# Patient Record
Sex: MALE | HISPANIC OR LATINO | Employment: OTHER | URBAN - METROPOLITAN AREA
[De-identification: names, ages, dates, MRNs, and addresses within clinical notes are randomized per-mention and may not be internally consistent; named-entity substitution may affect disease eponyms.]

---

## 2020-10-02 ENCOUNTER — RECORDS - HEALTHEAST (OUTPATIENT)
Dept: LAB | Facility: CLINIC | Age: 45
End: 2020-10-02

## 2020-10-02 LAB — HBA1C MFR BLD: 5.9 %

## 2021-05-18 ENCOUNTER — RECORDS - HEALTHEAST (OUTPATIENT)
Dept: LAB | Facility: HOSPITAL | Age: 46
End: 2021-05-18

## 2021-05-18 LAB — HBA1C MFR BLD: 5.8 %

## 2022-03-30 ENCOUNTER — APPOINTMENT (OUTPATIENT)
Dept: RADIOLOGY | Facility: HOSPITAL | Age: 47
End: 2022-03-30
Attending: EMERGENCY MEDICINE
Payer: MEDICAID

## 2022-03-30 ENCOUNTER — HOSPITAL ENCOUNTER (EMERGENCY)
Facility: HOSPITAL | Age: 47
Discharge: HOME | End: 2022-03-30
Attending: EMERGENCY MEDICINE | Admitting: EMERGENCY MEDICINE
Payer: MEDICAID

## 2022-03-30 VITALS
WEIGHT: 218 LBS | HEART RATE: 87 BPM | OXYGEN SATURATION: 95 % | RESPIRATION RATE: 18 BRPM | TEMPERATURE: 98.5 F | SYSTOLIC BLOOD PRESSURE: 147 MMHG | DIASTOLIC BLOOD PRESSURE: 87 MMHG

## 2022-03-30 DIAGNOSIS — K21.9 GASTROESOPHAGEAL REFLUX DISEASE WITHOUT ESOPHAGITIS: ICD-10-CM

## 2022-03-30 DIAGNOSIS — R06.2 WHEEZING: ICD-10-CM

## 2022-03-30 LAB
FLUAV RNA SPEC QL NAA+PROBE: NEGATIVE
FLUBV RNA RESP QL NAA+PROBE: NEGATIVE
GROUP A STREP BY PCR: NOT DETECTED
SARS-COV-2 RNA RESP QL NAA+PROBE: NEGATIVE

## 2022-03-30 PROCEDURE — 71045 X-RAY EXAM CHEST 1 VIEW: CPT

## 2022-03-30 PROCEDURE — 250N000013 HC RX MED GY IP 250 OP 250 PS 637: Performed by: EMERGENCY MEDICINE

## 2022-03-30 PROCEDURE — 87651 STREP A DNA AMP PROBE: CPT | Performed by: EMERGENCY MEDICINE

## 2022-03-30 PROCEDURE — 87636 SARSCOV2 & INF A&B AMP PRB: CPT | Performed by: STUDENT IN AN ORGANIZED HEALTH CARE EDUCATION/TRAINING PROGRAM

## 2022-03-30 PROCEDURE — 99284 EMERGENCY DEPT VISIT MOD MDM: CPT | Mod: 25

## 2022-03-30 PROCEDURE — 94640 AIRWAY INHALATION TREATMENT: CPT

## 2022-03-30 PROCEDURE — C9803 HOPD COVID-19 SPEC COLLECT: HCPCS

## 2022-03-30 RX ORDER — FAMOTIDINE 20 MG/1
20 TABLET, FILM COATED ORAL 2 TIMES DAILY
Qty: 30 TABLET | Refills: 0 | Status: SHIPPED | OUTPATIENT
Start: 2022-03-30

## 2022-03-30 RX ORDER — ALBUTEROL SULFATE 90 UG/1
4 AEROSOL, METERED RESPIRATORY (INHALATION) EVERY 6 HOURS PRN
Status: DISCONTINUED | OUTPATIENT
Start: 2022-03-30 | End: 2022-03-30 | Stop reason: HOSPADM

## 2022-03-30 RX ORDER — PREDNISONE 20 MG/1
TABLET ORAL
Qty: 10 TABLET | Refills: 0 | Status: SHIPPED | OUTPATIENT
Start: 2022-03-30 | End: 2022-08-17

## 2022-03-30 RX ORDER — ALBUTEROL SULFATE 90 UG/1
2 AEROSOL, METERED RESPIRATORY (INHALATION) EVERY 6 HOURS PRN
Qty: 18 G | Refills: 0 | Status: SHIPPED | OUTPATIENT
Start: 2022-03-30

## 2022-03-30 RX ORDER — MAGNESIUM HYDROXIDE/ALUMINUM HYDROXICE/SIMETHICONE 120; 1200; 1200 MG/30ML; MG/30ML; MG/30ML
30 SUSPENSION ORAL ONCE
Status: COMPLETED | OUTPATIENT
Start: 2022-03-30 | End: 2022-03-30

## 2022-03-30 RX ADMIN — ALUMINUM HYDROXIDE, MAGNESIUM HYDROXIDE, AND SIMETHICONE 30 ML: 200; 200; 20 SUSPENSION ORAL at 20:43

## 2022-03-30 RX ADMIN — ALBUTEROL SULFATE 4 PUFF: 90 AEROSOL, METERED RESPIRATORY (INHALATION) at 18:54

## 2022-03-30 NOTE — ED TRIAGE NOTES
Pt is here for shortness of breath that started 4 weeks ago. Pt is vacinated for covid, 2 dose. Has not had contact with anyone who has Covid. Works as a . Pt. Denies asthma.

## 2022-03-30 NOTE — ED PROVIDER NOTES
EMERGENCY DEPARTMENT ENCOUNTER      NAME: Dewayne Jha  AGE: 46 year old male  YOB: 1975  MRN: [id number]  EVALUATION DATE & TIME: 3/30/2022  6:10 PM    PCP: No primary care provider on file.    ED PROVIDER: Satish Leavitt M.D.      Chief Complaint   Patient presents with     Shortness of Breath         FINAL IMPRESSION:  GERD  Wheezing      ED COURSE & MEDICAL DECISION MAKING:    Pertinent Labs & Imaging studies reviewed. (See chart for details)  46 year old male presents to the Emergency Department for evaluation of cough and shortness of breath.  Symptoms been ongoing for last 3 to 4 weeks.  He reports a tightness in his throat.  Denies any fevers or chills.  No vomiting diarrhea.  Is vaccinated for COVID but not boosted.  Non-smoker.  Does work as a  but has done so for quite some time so no obvious new exposures.  On exam he is a well-nourished old male mild distress.  Vital signs unremarkable.  Oxygenation normal.  Minimal wheezing with deep respirations.  Given reports patient swab for Covid and influenza from triage.  Chest x-ray be obtained.  Rapid strep screen will also be obtained.  Patient be treated with albuterol metered-dose inhaler x4 puffs.. Patient appears non toxic with stable vitals signs. Overall exam is benign.        6:17 PM I met with the patient for the initial interview and physical examination. Discussed plan for treatment and workup in the ED.    7:37 PM.  Influenza and Covid swabs negative.  Chest x-ray is negative.  Awaiting strep screen  7:43 PM I rechecked and updated the patient.  Patient reports feeling improved after inhaler.  However now reports some slight abdominal discomfort.  Will give Mylanta.  Plan will be for continued outpatient management with prednisone and albuterol.  Also be placed on Pepcid for likely GERD symptomatology.  At the conclusion of the encounter I discussed the results of all of the tests and the disposition. The questions were  answered and return precautions provided. The patient or family acknowledged understanding and was agreeable with the care plan.   7:52 PM.  Rapid strep screen delayed.  However suspicion is low.  Will not initiate antibiotics presently    PPE: Provider wore gloves, N95 mask, eye protection, surgical cap.     MEDICATIONS GIVEN IN THE EMERGENCY:  Medications   albuterol (PROVENTIL HFA/VENTOLIN HFA) inhaler (4 puffs Inhalation Given 3/30/22 1854)       NEW PRESCRIPTIONS STARTED AT TODAY'S ER VISIT  New Prescriptions    No medications on file          =================================================================    HPI    Patient information was obtained from: Patient     Use of Intrepreter: N/A         Dewayne Jha is a 46 year old male, who is otherwise healthy, who presents to the ED for evaluation of shortness of breath.    The patient presents shortness of breath and tightness in throat for the past 3-4 weeks with a mild cough. No fevers, vomiting, or diarrhea. He has no history of asthma. The patient is vaccinated for COVID-19 with 2 doses. No sick exposures. No other symptoms or complaints at this time.    Social Hx: Non-Smoker. Works as  but has done so for some time.    REVIEW OF SYSTEMS   Constitutional:  Denies fever, chills  HENT: Positive for tightness in throat  Respiratory: Positive for shortness of breath and cough  Cardiovascular:  Denies chest pain, palpitations  GI:  Denies abdominal pain, nausea, vomiting, or change in bowel or bladder habits   Musculoskeletal:  Denies any new muscle/joint swelling  Skin:  Denies rash   Neurologic:  Denies focal weakness  All systems negative except as marked.     PAST MEDICAL HISTORY:  History reviewed. No pertinent past medical history.    PAST SURGICAL HISTORY:  History reviewed. No pertinent surgical history.      CURRENT MEDICATIONS:      Current Facility-Administered Medications:      albuterol (PROVENTIL HFA/VENTOLIN HFA) inhaler, 4 puff,  Inhalation, Q6H PRN, Satish Leavitt MD, 4 puff at 03/30/22 1854  No current outpatient medications on file.    ALLERGIES:  No Known Allergies    FAMILY HISTORY:  History reviewed. No pertinent family history.    SOCIAL HISTORY:   Social History     Socioeconomic History     Marital status: Not on file     Spouse name: Not on file     Number of children: Not on file     Years of education: Not on file     Highest education level: Not on file   Occupational History     Not on file   Tobacco Use     Smoking status: Not on file     Smokeless tobacco: Not on file   Substance and Sexual Activity     Alcohol use: Not on file     Drug use: Not on file     Sexual activity: Not on file   Other Topics Concern     Not on file   Social History Narrative     Not on file     Social Determinants of Health     Financial Resource Strain: Not on file   Food Insecurity: Not on file   Transportation Needs: Not on file   Physical Activity: Not on file   Stress: Not on file   Social Connections: Not on file   Intimate Partner Violence: Not on file   Housing Stability: Not on file       VITALS:  Patient Vitals for the past 24 hrs:   BP Temp Temp src Pulse Resp SpO2 Weight   03/30/22 1915 (!) 161/76 -- -- 92 -- 95 % --   03/30/22 1900 (!) 147/78 -- -- 81 -- 93 % --   03/30/22 1845 (!) 149/82 -- -- 85 -- 96 % --   03/30/22 1830 (!) 161/91 -- -- 82 -- 94 % --   03/30/22 1809 (!) 159/89 98.5  F (36.9  C) Temporal 84 18 95 % 98.9 kg (218 lb)        PHYSICAL EXAM    Constitutional:  Awake, alert, in no apparent distress  HENT:  Normocephalic, Atraumatic. Bilateral external ears normal. Oropharynx moist. Nose normal. Neck- Normal range of motion with no guarding, No midline cervical tenderness, Supple, No stridor.   Eyes:  PERRL, EOMI with no signs of entrapment, Conjunctiva normal, No discharge.   Respiratory:  Normal breath sounds. Minimal respiratory distress with tiny wheezing    Cardiovascular:  Normal heart rate, Normal rhythm, No  appreciable rubs or gallops.   GI:  Soft, No tenderness, No distension, No palpable masses  Musculoskeletal: No edema. Good range of motion in all major joints. No tenderness to palpation or major deformities noted.  Integument:  Warm, Dry, No erythema, No rash.   Neurologic:  Alert & oriented, Normal motor function, Normal sensory function, No focal deficits noted.   Psychiatric:  Affect normal, Judgment normal, Mood normal.     LAB:  All pertinent labs reviewed and interpreted.  Results for orders placed or performed during the hospital encounter of 03/30/22   XR Chest Port 1 View    Impression    IMPRESSION: Negative chest.   Symptomatic; Unknown Influenza A/B & SARS-CoV2 (COVID-19) Virus PCR Multiplex Nasopharyngeal    Specimen: Nasopharyngeal; Swab   Result Value Ref Range    Influenza A PCR Negative Negative    Influenza B PCR Negative Negative    SARS CoV2 PCR Negative Negative       RADIOLOGY:  Reviewed all pertinent imaging. Please see official radiology report.  XR Chest Port 1 View   Final Result   IMPRESSION: Negative chest.      Chest XR,  PA & LAT    (Results Pending)       I, Thierno Donald, am serving as a scribe to document services personally performed by Satish Leavitt MD, based on my observation and the provider's statements to me. ISatish MD attest that Thierno Donald is acting in a scribe capacity, has observed my performance of the services and has documented them in accordance with my direction.    Satish Leavitt M.D.  Emergency Medicine  [facility] EMERGENCY DEPARTMENT     Satish Leavitt MD  03/30/22 1952

## 2022-04-20 ENCOUNTER — APPOINTMENT (OUTPATIENT)
Dept: INTERPRETER SERVICES | Facility: CLINIC | Age: 47
End: 2022-04-20
Payer: MEDICAID

## 2022-04-22 ENCOUNTER — APPOINTMENT (OUTPATIENT)
Dept: INTERPRETER SERVICES | Facility: CLINIC | Age: 47
End: 2022-04-22
Payer: MEDICAID

## 2022-06-06 ENCOUNTER — TRANSCRIBE ORDERS (OUTPATIENT)
Dept: OTHER | Age: 47
End: 2022-06-06
Payer: MEDICAID

## 2022-06-06 DIAGNOSIS — J45.909 REACTIVE AIRWAY DISEASE: Primary | ICD-10-CM

## 2022-06-14 ENCOUNTER — TELEPHONE (OUTPATIENT)
Dept: PULMONOLOGY | Facility: CLINIC | Age: 47
End: 2022-06-14
Payer: MEDICAID

## 2022-06-14 ENCOUNTER — APPOINTMENT (OUTPATIENT)
Dept: INTERPRETER SERVICES | Facility: CLINIC | Age: 47
End: 2022-06-14
Payer: MEDICAID

## 2022-06-14 DIAGNOSIS — J98.4 RESTRICTIVE AIRWAY DISEASE: Primary | ICD-10-CM

## 2022-06-29 NOTE — TELEPHONE ENCOUNTER
RECORDS RECEIVED FROM: internal    DATE RECEIVED: 8/17/22    NOTES STATUS DETAILS   OFFICE NOTE from referring provider internal  Amy Lang MD   OFFICE NOTE from other specialist     DISCHARGE SUMMARY from hospital     DISCHARGE REPORT from the ER internal  3.30.22 [place]    MEDICATION LIST internal     IMAGING  (NEED IMAGES AND REPORTS)     CT SCAN     CHEST XRAY (CXR) internal  Scheduled 8/17/22   3.30.22    TESTS     PULMONARY FUNCTION TESTING (PFT) internal  Scheduled 8/17/22

## 2022-08-17 ENCOUNTER — PRE VISIT (OUTPATIENT)
Dept: PULMONOLOGY | Facility: CLINIC | Age: 47
End: 2022-08-17

## 2022-08-17 ENCOUNTER — ANCILLARY PROCEDURE (OUTPATIENT)
Dept: GENERAL RADIOLOGY | Facility: CLINIC | Age: 47
End: 2022-08-17
Attending: STUDENT IN AN ORGANIZED HEALTH CARE EDUCATION/TRAINING PROGRAM
Payer: MEDICAID

## 2022-08-17 ENCOUNTER — OFFICE VISIT (OUTPATIENT)
Dept: PULMONOLOGY | Facility: CLINIC | Age: 47
End: 2022-08-17
Attending: STUDENT IN AN ORGANIZED HEALTH CARE EDUCATION/TRAINING PROGRAM
Payer: MEDICAID

## 2022-08-17 VITALS
DIASTOLIC BLOOD PRESSURE: 84 MMHG | HEIGHT: 67 IN | WEIGHT: 219 LBS | BODY MASS INDEX: 34.37 KG/M2 | HEART RATE: 62 BPM | SYSTOLIC BLOOD PRESSURE: 133 MMHG | OXYGEN SATURATION: 98 %

## 2022-08-17 DIAGNOSIS — J98.4 RESTRICTIVE AIRWAY DISEASE: Primary | ICD-10-CM

## 2022-08-17 DIAGNOSIS — J98.4 RESTRICTIVE AIRWAY DISEASE: ICD-10-CM

## 2022-08-17 DIAGNOSIS — J45.30 MILD PERSISTENT ASTHMA WITHOUT COMPLICATION: Primary | ICD-10-CM

## 2022-08-17 PROCEDURE — 94726 PLETHYSMOGRAPHY LUNG VOLUMES: CPT | Performed by: INTERNAL MEDICINE

## 2022-08-17 PROCEDURE — 99204 OFFICE O/P NEW MOD 45 MIN: CPT | Mod: 25 | Performed by: STUDENT IN AN ORGANIZED HEALTH CARE EDUCATION/TRAINING PROGRAM

## 2022-08-17 PROCEDURE — 71046 X-RAY EXAM CHEST 2 VIEWS: CPT | Mod: GC | Performed by: RADIOLOGY

## 2022-08-17 PROCEDURE — 94729 DIFFUSING CAPACITY: CPT | Performed by: INTERNAL MEDICINE

## 2022-08-17 PROCEDURE — 94060 EVALUATION OF WHEEZING: CPT | Performed by: INTERNAL MEDICINE

## 2022-08-17 PROCEDURE — G0463 HOSPITAL OUTPT CLINIC VISIT: HCPCS | Mod: 25

## 2022-08-17 RX ORDER — FLUTICASONE PROPIONATE AND SALMETEROL 250; 50 UG/1; UG/1
1 POWDER RESPIRATORY (INHALATION) 2 TIMES DAILY
Qty: 1 EACH | Refills: 3 | Status: SHIPPED | OUTPATIENT
Start: 2022-08-17

## 2022-08-17 NOTE — LETTER
8/17/2022         RE: Dewayne Jha  [address]  [place] [address]        Dear Colleague,    Thank you for referring your patient, Dewayne Jha, to the [facility] [place]. Please see a copy of my visit note below.    Pulmonary Clinic Note   08/17/2022      PCP: No Ref-Primary, Physician    Reason for visit: Cough and shortness of breath    Pulmonary HPI:   Dewayne Jha is a 46 year old male w/o PMHx who presents for evaluation of shortness of breath.  The medical  is present for the entirety of the encounter.    Patient states that symptoms began more than a year ago with shortness of breath, and has worsened over this past year. Patient also reports intermittent wheezing that is triggered by pet allergen, cold rooms, and after eating large meals. He admits to an ED visit in March 2022 where he was given an ICS and albuterol rescue inhaler with improvement of his symptoms. He ran out of his steroid inhaler and continued to use his albuterol inhaler for his SOB and wheezing--admitting that he uses his albuterol inhaler from 2 times a day to less frequent depending on his exposures.      Patient is a non smoker, no illicit drug use. EtOH use 6-10 drinks on the weekends. No vaping or recreational drug use.   Patient has no pets.   Patient lives in a house.  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     Patient's outside records were reviewed via Care Everywhere &/or available paper records (sent for scanning).    Review of systems: a complete 12-point ROS conducted, & found to be negative w/ exceptions as noted in the HPI.    Past medical history:  No past medical history on file.    No past surgical history on file.    Social history:  Social History     Tobacco Use     Smoking status: Never Smoker     Smokeless tobacco: Never Used   Substance Use Topics     Alcohol use:  "Yes     Drug use: Never       Family history:  No family history on file.    Medications:  Current Outpatient Medications   Medication     albuterol (PROAIR HFA/PROVENTIL HFA/VENTOLIN HFA) 108 (90 Base) MCG/ACT inhaler     famotidine (PEPCID) 20 MG tablet     predniSONE (DELTASONE) 20 MG tablet     No current facility-administered medications for this visit.       Allergies:  No Known Allergies    Physical examination:  Ht 1.689 m (5' 6.5\")   Wt 99.3 kg (219 lb)   BMI 34.82 kg/m      General: NAD  Eyes: Anicteric sclera, PERRL, EOMI  Nose: Nasal mucosa w/o edema or hyperemia; no nasal polyps  Ears: EAC clear b/l w/ pearly gray TMs  Mouth: MMM w/o lesions; no tonsillar enlargement; no oropharyngeal exudate, or erythema  Neck: No masses, no thyromegaly  Lymphatics: No significant cervical or supraclavicular LNs  CV: RRR, no m/r/g. No pedal edema  Lungs: Inspiratory wheezes present in the LLL. Non labored breathing on room air.  Abd: Soft, NT, ND  Ext: WWP, no clubbing or cyanosis.  Skin: No rashes, skin is warm, dry.   Neuro: Intact mentation w/ normal speech. Normal gait & stance    Labs: No recent labs in the past 3 months.    Imaging: reviewed in EPIC & personally interpreted. Below are the interpretations from the formal Radiology review.    Chest X-Ray 2 view 8/17/22:  Impression:    1. No focal airspace disease.  2. Left hilar nodular opacity not seen on prior chest x-ray 3/30/2022,  may represent infective/inflammatory process distention on follow-up  recommended.    PFT:  Most Recent [facility] Pulmonary Function Testing (FVC/FEV1 only)  FVC-Pre   Date Value Ref Range Status   08/17/2022 4.24 L      FVC-%Pred-Pre   Date Value Ref Range Status   08/17/2022 101 %      FEV1-Pre   Date Value Ref Range Status   08/17/2022 2.86 L      FEV1-%Pred-Pre   Date Value Ref Range Status   08/17/2022 83 %        Impression & recommendations:    1.Mild persistent asthma without complication   Patient with no past medical " history of respiratory symptoms who presents with chronic SOB and intermittent wheezing over the past year, with known triggers such as pet dander and cold temperatures. He notes improvement with his ICS and albuterol use, clinically suggestive of asthma. Chest X-ray is unremarkable for pneumonia or pleural effusion, and spirometry demonstrates airflow obstruction. Given his symptoms and spirometry findings, he likely has mild persistent asthma. Will trial an ICS/LABA inhaler and reassess symptoms in 2 months to determine his asthma control.     -     fluticasone-salmeterol (ADVAIR) 250-50 MCG/ACT inhaler; Inhale 1 puff into the lungs 2 times daily    RTC in 2 months with myself to evaluate symptoms.     Chart documentation was completed, in part, with [employer] voice-recognition software. Even though reviewed, some grammatical, spelling, and word errors may remain.    Patient was seen & discussed w/ Dr. Edge,  who is in agreement.    Chris Thacker MD   Pulmonary and Critical Care Fellow       Attestation signed by Ramesh Edge MD at 8/18/2022 11:31 AM:    Faculty Addendum:  This patient has been seen and evaluated by me.  I have discussed care with Dr. Thacker and agree with the findings and plan in this note.    Ramesh Edge MD  Pulmonary/Critical Care  August 18, 2022 11:30 AM          Again, thank you for allowing me to participate in the care of your patient.        Sincerely,        Chris Thacker MD

## 2022-08-17 NOTE — LETTER
Date:August 18, 2022      Patient was self referred, no letter generated. Do not send.        [facility] Health Information

## 2022-08-17 NOTE — NURSING NOTE
Chief Complaint   Patient presents with     New Patient     Restrictive airway disease      Vitals were taken and medications were reconciled.     Elaine Henderson RMA  3:24 PM

## 2022-08-17 NOTE — PROGRESS NOTES
Pulmonary Clinic Note   08/17/2022      PCP: No Ref-Primary, Physician    Reason for visit: Cough and shortness of breath    Pulmonary HPI:   Dewayne Jha is a 46 year old male w/o PMHx who presents for evaluation of shortness of breath.  The medical  is present for the entirety of the encounter.    Patient states that symptoms began more than a year ago with shortness of breath, and has worsened over this past year. Patient also reports intermittent wheezing that is triggered by pet allergen, cold rooms, and after eating large meals. He admits to an ED visit in March 2022 where he was given an ICS and albuterol rescue inhaler with improvement of his symptoms. He ran out of his steroid inhaler and continued to use his albuterol inhaler for his SOB and wheezing--admitting that he uses his albuterol inhaler from 2 times a day to less frequent depending on his exposures.      Patient is a non smoker, no illicit drug use. EtOH use 6-10 drinks on the weekends. No vaping or recreational drug use.   Patient has no pets.   Patient lives in a house.  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     Patient's outside records were reviewed via Care Everywhere &/or available paper records (sent for scanning).    Review of systems: a complete 12-point ROS conducted, & found to be negative w/ exceptions as noted in the HPI.    Past medical history:  No past medical history on file.    No past surgical history on file.    Social history:  Social History     Tobacco Use     Smoking status: Never Smoker     Smokeless tobacco: Never Used   Substance Use Topics     Alcohol use: Yes     Drug use: Never       Family history:  No family history on file.    Medications:  Current Outpatient Medications   Medication     albuterol (PROAIR HFA/PROVENTIL HFA/VENTOLIN HFA) 108 (90 Base) MCG/ACT inhaler     famotidine (PEPCID) 20 MG tablet     predniSONE (DELTASONE) 20 MG tablet     No  "current facility-administered medications for this visit.       Allergies:  No Known Allergies    Physical examination:  Ht 1.689 m (5' 6.5\")   Wt 99.3 kg (219 lb)   BMI 34.82 kg/m      General: NAD  Eyes: Anicteric sclera, PERRL, EOMI  Nose: Nasal mucosa w/o edema or hyperemia; no nasal polyps  Ears: EAC clear b/l w/ pearly gray TMs  Mouth: MMM w/o lesions; no tonsillar enlargement; no oropharyngeal exudate, or erythema  Neck: No masses, no thyromegaly  Lymphatics: No significant cervical or supraclavicular LNs  CV: RRR, no m/r/g. No pedal edema  Lungs: Inspiratory wheezes present in the LLL. Non labored breathing on room air.  Abd: Soft, NT, ND  Ext: WWP, no clubbing or cyanosis.  Skin: No rashes, skin is warm, dry.   Neuro: Intact mentation w/ normal speech. Normal gait & stance    Labs: No recent labs in the past 3 months.    Imaging: reviewed in EPIC & personally interpreted. Below are the interpretations from the formal Radiology review.    Chest X-Ray 2 view 8/17/22:  Impression:    1. No focal airspace disease.  2. Left hilar nodular opacity not seen on prior chest x-ray 3/30/2022,  may represent infective/inflammatory process distention on follow-up  recommended.    PFT:  Most Recent [facility] Pulmonary Function Testing (FVC/FEV1 only)  FVC-Pre   Date Value Ref Range Status   08/17/2022 4.24 L      FVC-%Pred-Pre   Date Value Ref Range Status   08/17/2022 101 %      FEV1-Pre   Date Value Ref Range Status   08/17/2022 2.86 L      FEV1-%Pred-Pre   Date Value Ref Range Status   08/17/2022 83 %        Impression & recommendations:    1.Mild persistent asthma without complication   Patient with no past medical history of respiratory symptoms who presents with chronic SOB and intermittent wheezing over the past year, with known triggers such as pet dander and cold temperatures. He notes improvement with his ICS and albuterol use, clinically suggestive of asthma. Chest X-ray is unremarkable for pneumonia or pleural " effusion, and spirometry demonstrates airflow obstruction. Given his symptoms and spirometry findings, he likely has mild persistent asthma. Will trial an ICS/LABA inhaler and reassess symptoms in 2 months to determine his asthma control.     -     fluticasone-salmeterol (ADVAIR) 250-50 MCG/ACT inhaler; Inhale 1 puff into the lungs 2 times daily    RTC in 2 months with myself to evaluate symptoms.     Chart documentation was completed, in part, with [employer] voice-recognition software. Even though reviewed, some grammatical, spelling, and word errors may remain.    Patient was seen & discussed w/ Dr. Edge,  who is in agreement.    Chris Thacker MD   Pulmonary and Critical Care Fellow

## 2022-08-18 LAB
DLCOUNC-%PRED-PRE: 134 %
DLCOUNC-PRE: 36.68 ML/MIN/MMHG
DLCOUNC-PRED: 27.21 ML/MIN/MMHG
ERV-%PRED-PRE: 102 %
ERV-PRE: 0.84 L
ERV-PRED: 0.82 L
EXPTIME-PRE: 8.38 SEC
FEF2575-%PRED-POST: 78 %
FEF2575-%PRED-PRE: 46 %
FEF2575-POST: 2.66 L/SEC
FEF2575-PRE: 1.59 L/SEC
FEF2575-PRED: 3.4 L/SEC
FEFMAX-%PRED-PRE: 67 %
FEFMAX-PRE: 6.29 L/SEC
FEFMAX-PRED: 9.3 L/SEC
FEV1-%PRED-PRE: 83 %
FEV1-PRE: 2.86 L
FEV1FEV6-PRE: 68 %
FEV1FEV6-PRED: 81 %
FEV1FVC-PRE: 67 %
FEV1FVC-PRED: 82 %
FEV1SVC-PRE: 67 %
FEV1SVC-PRED: 72 %
FIFMAX-PRE: 4.51 L/SEC
FRCPLETH-%PRED-PRE: 95 %
FRCPLETH-PRE: 3.1 L
FRCPLETH-PRED: 3.26 L
FVC-%PRED-PRE: 101 %
FVC-PRE: 4.24 L
FVC-PRED: 4.19 L
IC-%PRED-PRE: 86 %
IC-PRE: 3.41 L
IC-PRED: 3.93 L
RVPLETH-%PRED-PRE: 115 %
RVPLETH-PRE: 2.26 L
RVPLETH-PRED: 1.95 L
TLCPLETH-%PRED-PRE: 101 %
TLCPLETH-PRE: 6.51 L
TLCPLETH-PRED: 6.42 L
VA-%PRED-PRE: 89 %
VA-PRE: 5.23 L
VC-%PRED-PRE: 89 %
VC-PRE: 4.25 L
VC-PRED: 4.75 L

## 2022-08-22 ENCOUNTER — TELEPHONE (OUTPATIENT)
Dept: PULMONOLOGY | Facility: CLINIC | Age: 47
End: 2022-08-22

## 2022-08-22 NOTE — TELEPHONE ENCOUNTER
[facility] Call Center    Phone Message    May a detailed message be left on voicemail: yes     Reason for Call: Other: Ligia with [facility] is calling in regards to the patient's prescription for fluticasone-salmeterol (ADVAIR) 250-50 MCG/ACT inhaler. She states that they are in the process of assisting the patient to apply for the [place] Assistance Program, and they need a copy of the patient's prescription sent to them with Dr. Thacker's NPI number. She requests to have this faxed to [phone]. Please call back with any questions.     Action Taken: Message routed to:  [facility] Pulm    Travel Screening: Not Applicable

## 2022-08-30 ENCOUNTER — TELEPHONE (OUTPATIENT)
Dept: PULMONOLOGY | Facility: CLINIC | Age: 47
End: 2022-08-30

## 2022-08-30 ENCOUNTER — LAB REQUISITION (OUTPATIENT)
Dept: LAB | Facility: HOSPITAL | Age: 47
End: 2022-08-30

## 2022-08-30 LAB
CHOLEST SERPL-MCNC: 225 MG/DL
FASTING STATUS PATIENT QL REPORTED: YES
HBA1C MFR BLD: 5.7 %
HDLC SERPL-MCNC: 39 MG/DL
LDLC SERPL CALC-MCNC: 122 MG/DL
TRIGL SERPL-MCNC: 318 MG/DL

## 2022-08-30 PROCEDURE — 36415 COLL VENOUS BLD VENIPUNCTURE: CPT | Performed by: FAMILY MEDICINE

## 2022-08-30 PROCEDURE — 83036 HEMOGLOBIN GLYCOSYLATED A1C: CPT | Performed by: FAMILY MEDICINE

## 2022-08-30 PROCEDURE — 80061 LIPID PANEL: CPT | Performed by: FAMILY MEDICINE

## 2022-08-30 NOTE — TELEPHONE ENCOUNTER
[facility] Call Center    Phone Message    May a detailed message be left on voicemail: yes     Reason for Call: Other: Han Strong would like if someone from the care team can fax over the appt notes on 08/17 so they can have that on file also. Please fax info/notes to . If any question please Ligia back at . Thank you     Action Taken: Message routed to:  [facility] PULM    Travel Screening: Not Applicable

## 2022-10-13 ENCOUNTER — TELEPHONE (OUTPATIENT)
Dept: PULMONOLOGY | Facility: CLINIC | Age: 47
End: 2022-10-13

## 2022-10-13 NOTE — TELEPHONE ENCOUNTER
[facility] Call Center    Phone Message    May a detailed message be left on voicemail: yes     Reason for Call: Medication Question or concern regarding medication   Prescription Clarification  Name of Medication: Pulmicort   Prescribing Provider: Dr. Thacker   Pharmacy: On File   What on the order needs clarification? Ligia is wanting to know if the pulmicort could be changed to a flovent due to cost. Please advise. Thank you!            Action Taken: Message routed to:  [facility] Pulm    Travel Screening: Not Applicable

## 2022-10-14 NOTE — TELEPHONE ENCOUNTER
Writer was confused at the request from [facility] to have Flovent ordered instead of Pulmicort as writer could not find any record that the patient was on Pulmicort. Dr. Thacker had ordered Advair 250-50.     Writer called Ligia at [facility] to clarify request. Ligia said she accidentally asked our clinic for alternative medication as she discovered a provider at [facility] had put patient on Pulmicort. Ligia said Advair will be covered by [facility] patient assistance program. Writer told Ligia that patient should not take Advair and Pulmicort. Ligia said she will discontinue the Pulmicort and keep the patient on Advair as Dr. Thacker had prescribed.

## 2022-12-09 ENCOUNTER — OFFICE VISIT (OUTPATIENT)
Dept: PULMONOLOGY | Facility: CLINIC | Age: 47
End: 2022-12-09
Attending: STUDENT IN AN ORGANIZED HEALTH CARE EDUCATION/TRAINING PROGRAM
Payer: MEDICAID

## 2022-12-09 ENCOUNTER — ANCILLARY PROCEDURE (OUTPATIENT)
Dept: GENERAL RADIOLOGY | Facility: CLINIC | Age: 47
End: 2022-12-09
Attending: STUDENT IN AN ORGANIZED HEALTH CARE EDUCATION/TRAINING PROGRAM
Payer: MEDICAID

## 2022-12-09 VITALS — SYSTOLIC BLOOD PRESSURE: 154 MMHG | OXYGEN SATURATION: 94 % | DIASTOLIC BLOOD PRESSURE: 95 MMHG | HEART RATE: 86 BPM

## 2022-12-09 DIAGNOSIS — R91.1 LUNG NODULE: Primary | ICD-10-CM

## 2022-12-09 DIAGNOSIS — R91.1 LUNG NODULE: ICD-10-CM

## 2022-12-09 PROCEDURE — 99214 OFFICE O/P EST MOD 30 MIN: CPT | Mod: GC | Performed by: STUDENT IN AN ORGANIZED HEALTH CARE EDUCATION/TRAINING PROGRAM

## 2022-12-09 PROCEDURE — 71046 X-RAY EXAM CHEST 2 VIEWS: CPT | Mod: GC | Performed by: STUDENT IN AN ORGANIZED HEALTH CARE EDUCATION/TRAINING PROGRAM

## 2022-12-09 PROCEDURE — G0463 HOSPITAL OUTPT CLINIC VISIT: HCPCS | Performed by: STUDENT IN AN ORGANIZED HEALTH CARE EDUCATION/TRAINING PROGRAM

## 2022-12-09 ASSESSMENT — PAIN SCALES - GENERAL: PAINLEVEL: NO PAIN (0)

## 2022-12-09 NOTE — PATIENT INSTRUCTIONS
Trent por venir a la clinica.    Sigue con víctor inhaladores, y si quiere un cambio de street Advair por favor llama la clinica. Vamos a ordenar cinthia radiografia del pecho para evaluar el nodulo. No estamos seguros que es el nodulo, trudy queremos a summer si crecio o es lo mismo tamano.    Regresa a la clinica in 3 meses con Dr. Thacker.

## 2022-12-09 NOTE — PROGRESS NOTES
Pulmonary Clinic Note   12/09/2022      PCP: No Ref-Primary, Physician    Reason for visit: Asthma follow up.    Pulmonary HPI:   Dewayne Jha is a 46 year old male w/o PMHx who returns to clinic for follow up of his asthma management. Patient reports his symptoms have markedly improved after starting on Advair, stating his wheezing and shortness of breath have resolved. He admits to intermittent airway irritation due to the dry powder formulation, and that this self resolves over the course of the day. He reports less reliance of his albuterol inhaler as well when taking his daily ICS/LABA. Patient denies fever, chills, CP, hemoptysis, night sweats, cough. He denies receiving the influenza vaccine, though plans on visiting his primary care clinic at [facility] to receive it in the next couple of weeks.    Review of systems: a complete 12-point ROS conducted, & found to be negative w/ exceptions as noted in the HPI.    Past medical history:  Asthma    Past Surgical history:  Reviewed, non contributory.    Social history:  Social History     Tobacco Use     Smoking status: Never     Smokeless tobacco: Never   Substance Use Topics     Alcohol use: Yes     Drug use: Never       Family history:  Reviewed, non contributory    Medications:  Current Outpatient Medications   Medication     albuterol (PROAIR HFA/PROVENTIL HFA/VENTOLIN HFA) 108 (90 Base) MCG/ACT inhaler     famotidine (PEPCID) 20 MG tablet     fluticasone-salmeterol (ADVAIR) 250-50 MCG/ACT inhaler     No current facility-administered medications for this visit.       Allergies:  No Known Allergies    Physical examination:  BP (!) 154/95 (BP Location: Right arm, Patient Position: Sitting)   Pulse 86   SpO2 94%     General: NAD, cooperative  Eyes: Anicteric sclera, PERRL, EOMI  Neck: No masses, no thyromegaly  CV: RRR, no m/r/g.   Lungs: Non labored breathing on room air. CTAB posteriorly.  Ext: No clubbing or cyanosis. No pedal edema.  Skin: No  rashes. The skin is warm and dry.  Neuro: Intact mentation w/ normal speech. Normal strength & muscle tone w/ normal gait & stance    Labs: reviewed in [facility] & personally interpreted.     No new labs since last encounter.    Imaging: reviewed in EPIC & personally interpreted. Below are the interpretations from the formal Radiology review.    Chest X-ray 8/17/2022  1. No focal airspace disease.  2. Left hilar nodular opacity not seen on prior chest x-ray 3/30/2022,  may represent infective/inflammatory process distention on follow-up  recommended.    Assessment/Plan    1.Mild persistent asthma without complication  Improved after starting his ICS/LABA regimen. Although patient reports airway irritation with the dry powder formulation, it is not bothersome to the point of impacting his activities of daily living. I suggested to him I can switch his inhaler regimen, but he is OK with it at this time. He will continue to get his refills through his primary care clinic, since this is the most cost effective way of obtaining his meds.  -Continue with daily ICS/LABA + FEDERICO PRN     2. Lung nodule on CXR  Seen on his most recent CXR; unclear of the nodule etiology. Will repeat CXR today and review results with patient next week.  -CXR 2 view ordered    3. Immunizations  Patient reports he did not get the flu shot yet. Discussed with him the importance of receiving the flu vaccine given that his asthma places him at higher risk for severe respiratory distress and that the virus is more widespread this year. He reports he will receive the vaccine in the next 1-2 weeks when he returns to his primary care clinic.  -Strongly recommended influenza vaccine to patient; will follow up at next visit.    RTC in 3 months for asthma management, or sooner depending on the results of today's CXR.     Patient was seen & discussed w/ Dr. Edge,  who is in agreement.    Chris Thacker MD   Pulmonary and Critical Care Fellow

## 2022-12-09 NOTE — LETTER
12/9/2022         RE: Dewayne Jha  [address]  [place] [address]        Dear Colleague,    Thank you for referring your patient, Dewayne Jha, to the [facility] [place]. Please see a copy of my visit note below.    Pulmonary Clinic Note   12/09/2022      PCP: No Ref-Primary, Physician    Reason for visit: Asthma follow up.    Pulmonary HPI:   Dewayne Jha is a 46 year old male w/o PMHx who returns to clinic for follow up of his asthma management. Patient reports his symptoms have markedly improved after starting on Advair, stating his wheezing and shortness of breath have resolved. He admits to intermittent airway irritation due to the dry powder formulation, and that this self resolves over the course of the day. He reports less reliance of his albuterol inhaler as well when taking his daily ICS/LABA. Patient denies fever, chills, CP, hemoptysis, night sweats, cough. He denies receiving the influenza vaccine, though plans on visiting his primary care clinic at [facility] to receive it in the next couple of weeks.    Review of systems: a complete 12-point ROS conducted, & found to be negative w/ exceptions as noted in the HPI.    Past medical history:  Asthma    Past Surgical history:  Reviewed, non contributory.    Social history:  Social History     Tobacco Use     Smoking status: Never     Smokeless tobacco: Never   Substance Use Topics     Alcohol use: Yes     Drug use: Never       Family history:  Reviewed, non contributory    Medications:  Current Outpatient Medications   Medication     albuterol (PROAIR HFA/PROVENTIL HFA/VENTOLIN HFA) 108 (90 Base) MCG/ACT inhaler     famotidine (PEPCID) 20 MG tablet     fluticasone-salmeterol (ADVAIR) 250-50 MCG/ACT inhaler     No current facility-administered medications for this visit.       Allergies:  No Known Allergies    Physical examination:  BP (!) 154/95 (BP Location:  Right arm, Patient Position: Sitting)   Pulse 86   SpO2 94%     General: NAD, cooperative  Eyes: Anicteric sclera, PERRL, EOMI  Neck: No masses, no thyromegaly  CV: RRR, no m/r/g.   Lungs: Non labored breathing on room air. CTAB posteriorly.  Ext: No clubbing or cyanosis. No pedal edema.  Skin: No rashes. The skin is warm and dry.  Neuro: Intact mentation w/ normal speech. Normal strength & muscle tone w/ normal gait & stance    Labs: reviewed in EPIC & personally interpreted.     No new labs since last encounter.    Imaging: reviewed in EPIC & personally interpreted. Below are the interpretations from the formal Radiology review.    Chest X-ray 8/17/2022  1. No focal airspace disease.  2. Left hilar nodular opacity not seen on prior chest x-ray 3/30/2022,  may represent infective/inflammatory process distention on follow-up  recommended.    Assessment/Plan    1.Mild persistent asthma without complication  Improved after starting his ICS/LABA regimen. Although patient reports airway irritation with the dry powder formulation, it is not bothersome to the point of impacting his activities of daily living. I suggested to him I can switch his inhaler regimen, but he is OK with it at this time. He will continue to get his refills through his primary care clinic, since this is the most cost effective way of obtaining his meds.  -Continue with daily ICS/LABA + FEDERICO PRN     2. Lung nodule on CXR  Seen on his most recent CXR; unclear of the nodule etiology. Will repeat CXR today and review results with patient next week.  -CXR 2 view ordered    3. Immunizations  Patient reports he did not get the flu shot yet. Discussed with him the importance of receiving the flu vaccine given that his asthma places him at higher risk for severe respiratory distress and that the virus is more widespread this year. He reports he will receive the vaccine in the next 1-2 weeks when he returns to his primary care clinic.  -Strongly  recommended influenza vaccine to patient; will follow up at next visit.    RTC in 3 months for asthma management, or sooner depending on the results of today's CXR.     Patient was seen & discussed w/ Dr. Edge,  who is in agreement.    Chris Thacker MD   Pulmonary and Critical Care Fellow       Attestation signed by Ramesh Edge MD at 12/9/2022  4:58 PM:    Faculty Addendum:  This patient has been seen and evaluated by me.  I have discussed care with Dr. Thacker and agree with the findings and plan in this note.    Ramesh Edge MD  Pulmonary/Critical Care  December 9, 2022 4:58 PM          Again, thank you for allowing me to participate in the care of your patient.        Sincerely,        Chris Thacker MD

## 2022-12-09 NOTE — NURSING NOTE
Chief Complaint   Patient presents with     Follow Up     10 week follow up      Vitals were taken and medications were reconciled.     Elaine Henderson RMA  4:10 PM

## 2022-12-09 NOTE — LETTER
Date:December 12, 2022      Patient was self referred, no letter generated. Do not send.        [facility] Health Information

## 2022-12-27 ENCOUNTER — TELEPHONE (OUTPATIENT)
Dept: PULMONOLOGY | Facility: CLINIC | Age: 47
End: 2022-12-27

## 2022-12-27 NOTE — TELEPHONE ENCOUNTER
Le Cagle from [facility] [phone], would like to know when Dewayne should be seen next. She said the referral that was sent to her states that the timeframe is dependant on xray results. RN will confirm with MD if he would like him seen at the three month timeframe as originally specified in the progress note, or either sooner or later.    Addendum 12/28 1116: Returned call and notified that per Dr. Thacker, three month follow up timeframe is appropriate.

## 2022-12-28 ENCOUNTER — TELEPHONE (OUTPATIENT)
Dept: PULMONOLOGY | Facility: CLINIC | Age: 47
End: 2022-12-28

## 2022-12-28 NOTE — TELEPHONE ENCOUNTER
Called pt no answer lvm via  line to schedule 3 month follow up  March 2023 via call center [phone]

## 2023-01-04 ENCOUNTER — TELEPHONE (OUTPATIENT)
Dept: PULMONOLOGY | Facility: CLINIC | Age: 48
End: 2023-01-04

## 2023-01-04 NOTE — TELEPHONE ENCOUNTER
Fax cover sheet, demographic sheet and signed rx faxed to Ligia Olmos at [facility] [phone]

## 2023-01-31 ENCOUNTER — LAB REQUISITION (OUTPATIENT)
Dept: LAB | Facility: HOSPITAL | Age: 48
End: 2023-01-31

## 2023-01-31 LAB
CHOLEST SERPL-MCNC: 253 MG/DL
HDLC SERPL-MCNC: 37 MG/DL
LDLC SERPL CALC-MCNC: 138 MG/DL
NONHDLC SERPL-MCNC: 216 MG/DL
TRIGL SERPL-MCNC: 392 MG/DL

## 2023-01-31 PROCEDURE — 80061 LIPID PANEL: CPT | Performed by: FAMILY MEDICINE

## 2023-01-31 PROCEDURE — 36415 COLL VENOUS BLD VENIPUNCTURE: CPT | Performed by: FAMILY MEDICINE

## 2023-02-01 ENCOUNTER — LAB REQUISITION (OUTPATIENT)
Dept: LAB | Facility: CLINIC | Age: 48
End: 2023-02-01

## 2023-02-01 LAB — HBA1C MFR BLD: 6.1 %

## 2023-02-01 PROCEDURE — 83036 HEMOGLOBIN GLYCOSYLATED A1C: CPT | Performed by: FAMILY MEDICINE

## 2023-03-17 ENCOUNTER — OFFICE VISIT (OUTPATIENT)
Dept: PULMONOLOGY | Facility: CLINIC | Age: 48
End: 2023-03-17
Attending: STUDENT IN AN ORGANIZED HEALTH CARE EDUCATION/TRAINING PROGRAM
Payer: MEDICAID

## 2023-03-17 VITALS — DIASTOLIC BLOOD PRESSURE: 95 MMHG | OXYGEN SATURATION: 95 % | HEART RATE: 64 BPM | SYSTOLIC BLOOD PRESSURE: 164 MMHG

## 2023-03-17 DIAGNOSIS — R91.1 LUNG NODULE: Primary | ICD-10-CM

## 2023-03-17 PROCEDURE — G0463 HOSPITAL OUTPT CLINIC VISIT: HCPCS | Performed by: STUDENT IN AN ORGANIZED HEALTH CARE EDUCATION/TRAINING PROGRAM

## 2023-03-17 PROCEDURE — 99213 OFFICE O/P EST LOW 20 MIN: CPT | Mod: GC | Performed by: STUDENT IN AN ORGANIZED HEALTH CARE EDUCATION/TRAINING PROGRAM

## 2023-03-17 ASSESSMENT — PAIN SCALES - GENERAL: PAINLEVEL: NO PAIN (0)

## 2023-03-17 NOTE — LETTER
Date:March 22, 2023      Patient was self referred, no letter generated. Do not send.        [facility] Health Information

## 2023-03-17 NOTE — NURSING NOTE
Chief Complaint   Patient presents with     Follow Up     RAD Follow up      Vitals were taken and medications were reconciled.    Elaine Henderson RMA  4:20 PM

## 2023-03-17 NOTE — LETTER
3/17/2023         RE: Dewayne Jha  [address]  [place] [address]        Dear Colleague,    Thank you for referring your patient, Dewayne Jha, to the [facility] [place]. Please see a copy of my visit note below.    Pulmonary Clinic Note   03/17/2023      PCP: No Ref-Primary, Physician    Reason for visit: Asthma, lung nodule follow up.    Pulmonary HPI:   Dewayne Jha is a 46 year old male w/o PMHx who returns to clinic for follow up of his asthma management.    Patient's symptoms remain well controlled on Advair, noting he has not had any flareups throughout the winter.  He denies any cough, shortness of breath, fever, chills, night sweats, wheezing, paroxysmal nocturnal dyspnea, or chest pain.      Review of systems: a complete 12-point ROS conducted, & found to be negative w/ exceptions as noted in the HPI.    Past medical history:  Asthma    Past Surgical history:  Reviewed, non contributory.    Social history:  Social History     Tobacco Use     Smoking status: Never     Smokeless tobacco: Never   Substance Use Topics     Alcohol use: Yes     Drug use: Never       Family history:  Reviewed, non contributory    Medications:  Current Outpatient Medications   Medication     albuterol (PROAIR HFA/PROVENTIL HFA/VENTOLIN HFA) 108 (90 Base) MCG/ACT inhaler     fluticasone-salmeterol (ADVAIR) 250-50 MCG/ACT inhaler     famotidine (PEPCID) 20 MG tablet     No current facility-administered medications for this visit.       Allergies:  No Known Allergies    Physical examination:  BP (!) 164/95 (BP Location: Right arm, Patient Position: Chair, Cuff Size: Adult Regular)   Pulse 64   SpO2 95%     General: NAD, cooperative  Eyes: Anicteric sclera, PERRL, EOMI  CV: RRR, no m/r/g.   Lungs: Non labored breathing on room air. CTAB posteriorly.  Ext: No clubbing or cyanosis. No pedal edema.  Skin: No rashes. The skin is warm and  dry.  Neuro: Intact mentation w/ normal speech    Labs: reviewed in EPIC & personally interpreted.     No new labs since last encounter.    Imaging: reviewed in EPIC & personally interpreted. Below are the interpretations from the formal Radiology review.    Chest X-ray 12/09/2022  IMPRESSION: No acute airspace disease. Persistent nodular opacity in the left hilum compared to prior x-ray 8/17/2022, may consider CT chest to rule out solitary pulmonary nodule.  recommended.    Assessment/Plan    1.Mild persistent asthma without complication  Stable at this time; no active issues.  -Continue with daily ICS/LABA + FEDERICO PRN     2. Lung nodule on CXR  Seen on his most recent CXR; unclear of the nodule etiology. CT chest low dose ordered to characterize lung nodule.  -LDCT Chest ordered      RTC in 6 months for asthma management, or sooner depending on CT chest results.    Patient was seen & discussed w/ Dr. Saba,  who is in agreement.    Chris Thacker MD   Pulmonary and Critical Care Fellow       Attending note:  Patient seen, examined, and discussed with Dr. Thacker.  All data reviewed.  Agree with the assessment and plan as outlined in the above note.  History of asthma, at present he is well controlled on current regimen.  Will obtain lung cancer screening CT.    Marium Saba MD  [phone]          Again, thank you for allowing me to participate in the care of your patient.        Sincerely,        Chris Thacker MD

## 2023-03-17 NOTE — PROGRESS NOTES
Pulmonary Clinic Note   03/17/2023      PCP: No Ref-Primary, Physician    Reason for visit: Asthma, lung nodule follow up.    Pulmonary HPI:   Dewayne Jha is a 46 year old male w/o PMHx who returns to clinic for follow up of his asthma management.    Patient's symptoms remain well controlled on Advair, noting he has not had any flareups throughout the winter.  He denies any cough, shortness of breath, fever, chills, night sweats, wheezing, paroxysmal nocturnal dyspnea, or chest pain.      Review of systems: a complete 12-point ROS conducted, & found to be negative w/ exceptions as noted in the HPI.    Past medical history:  Asthma    Past Surgical history:  Reviewed, non contributory.    Social history:  Social History     Tobacco Use     Smoking status: Never     Smokeless tobacco: Never   Substance Use Topics     Alcohol use: Yes     Drug use: Never       Family history:  Reviewed, non contributory    Medications:  Current Outpatient Medications   Medication     albuterol (PROAIR HFA/PROVENTIL HFA/VENTOLIN HFA) 108 (90 Base) MCG/ACT inhaler     fluticasone-salmeterol (ADVAIR) 250-50 MCG/ACT inhaler     famotidine (PEPCID) 20 MG tablet     No current facility-administered medications for this visit.       Allergies:  No Known Allergies    Physical examination:  BP (!) 164/95 (BP Location: Right arm, Patient Position: Chair, Cuff Size: Adult Regular)   Pulse 64   SpO2 95%     General: NAD, cooperative  Eyes: Anicteric sclera, PERRL, EOMI  CV: RRR, no m/r/g.   Lungs: Non labored breathing on room air. CTAB posteriorly.  Ext: No clubbing or cyanosis. No pedal edema.  Skin: No rashes. The skin is warm and dry.  Neuro: Intact mentation w/ normal speech    Labs: reviewed in EPIC & personally interpreted.     No new labs since last encounter.    Imaging: reviewed in [email or web address] & personally interpreted. Below are the interpretations from the formal Radiology review.    Chest X-ray 12/09/2022  IMPRESSION: No acute  airspace disease. Persistent nodular opacity in the left hilum compared to prior x-ray 8/17/2022, may consider CT chest to rule out solitary pulmonary nodule.  recommended.    Assessment/Plan    1.Mild persistent asthma without complication  Stable at this time; no active issues.  -Continue with daily ICS/LABA + FEDERICO PRN     2. Lung nodule on CXR  Seen on his most recent CXR; unclear of the nodule etiology. CT chest low dose ordered to characterize lung nodule.  -LDCT Chest ordered      RTC in 6 months for asthma management, or sooner depending on CT chest results.    Patient was seen & discussed w/ Dr. Saba,  who is in agreement.    Chris Thacker MD   Pulmonary and Critical Care Fellow       Attending note:  Patient seen, examined, and discussed with Dr. Thacker.  All data reviewed.  Agree with the assessment and plan as outlined in the above note.  History of asthma, at present he is well controlled on current regimen.  Will obtain lung cancer screening CT.    Marium Saba MD  [phone]

## 2023-04-17 ENCOUNTER — LAB REQUISITION (OUTPATIENT)
Dept: LAB | Facility: HOSPITAL | Age: 48
End: 2023-04-17

## 2023-04-17 LAB
CHOLEST SERPL-MCNC: 194 MG/DL
CK SERPL-CCNC: 131 U/L (ref 39–308)
HDLC SERPL-MCNC: 37 MG/DL
LDLC SERPL CALC-MCNC: 112 MG/DL
NONHDLC SERPL-MCNC: 157 MG/DL
TRIGL SERPL-MCNC: 224 MG/DL

## 2023-04-17 PROCEDURE — 36415 COLL VENOUS BLD VENIPUNCTURE: CPT | Performed by: FAMILY MEDICINE

## 2023-04-17 PROCEDURE — 80061 LIPID PANEL: CPT | Performed by: FAMILY MEDICINE

## 2023-04-17 PROCEDURE — 82550 ASSAY OF CK (CPK): CPT | Performed by: FAMILY MEDICINE

## 2023-04-25 ENCOUNTER — TELEPHONE (OUTPATIENT)
Dept: PULMONOLOGY | Facility: CLINIC | Age: 48
End: 2023-04-25
Payer: MEDICAID

## 2023-04-25 NOTE — TELEPHONE ENCOUNTER
Health Call Center    Phone Message    May a detailed message be left on voicemail: yes     Reason for Call: Medication Question or concern regarding medication   Prescription Clarification  Name of Medication: albuterol (PROAIR HFA/PROVENTIL HFA/VENTOLIN HFA) 108 (90 Base) MCG/ACT inhaler  fluticasone-salmeterol (ADVAIR) 250-50 MCG/ACT inhaler  Prescribing Provider: Dr. Thacker      What on the order needs clarification? Ligia calling from [facility] stating that patient gets his above medications through patient assistance and they will not be doing that any longer, Ligia is wondering if there are any other recommendations for inhalers the patient can get through patient assistance. Please call thank you.           Action Taken: Message routed to:  [facility] pulm    Travel Screening: Not Applicable

## 2023-04-26 ENCOUNTER — TELEPHONE (OUTPATIENT)
Dept: PULMONOLOGY | Facility: CLINIC | Age: 48
End: 2023-04-26
Payer: MEDICAID

## 2023-04-26 NOTE — TELEPHONE ENCOUNTER
Spoke with Ligia from [facility] and discussed that the only way our clinic would be able to find alternatives is to call his insurance, but as he is uninsured, there is no way to find this information. She states she is agreeable to looking into options if we could provider her a list. Provided alternatives to Advair Diskus as noted below and will contact our clinic if needed,     Advair HFA    AirDuo Digihbill Cardenaso RespiClick    Breo Ellipta     Dulera    Symbicort    Wixela

## 2023-04-26 NOTE — TELEPHONE ENCOUNTER
Returned call to Ligia at [facility].  Pt does not have any insurance coverage at this time. He is managed at [facility] that we previously able to provide albuterol and Advair via the [employer] pt assistance program.  Fara states that [employer] are no longer able to provide free product and she's looking for recommendations alternatives.  She does not know what will be covered.  She asks that we investigate alternatives.  Message to Liane to assist.

## 2023-05-02 NOTE — CONFIDENTIAL NOTE
Writer returned call to Ligia at [facility] to answer questions.  Writekatelynn stated that Advair should not be taken as needed - that is a daily medication. Ligia stated she will call patient to see if he wants to sign up for [facility] to get generic advair for $60 and albuterol for $16. Ligia will call back and let us know if and where to send prescription.

## 2023-05-02 NOTE — TELEPHONE ENCOUNTER
Health Call Center    Phone Message    May a detailed message be left on voicemail: yes     Reason for Call: Medication Question or concern regarding medication   Prescription Clarification  Name of Medication: fluticasone-salmeterol (ADVAIR) 250-50 MCG/ACT inhaler  albuterol (PROAIR HFA/PROVENTIL HFA/VENTOLIN HFA) 108 (90 Base) MCG/ACT inhaler    Prescribing Provider: Dr Thacker   Pharmacy: [facility]   What on the order needs clarification? Per augustin at [facility], they are trying to help patient find cheaper RX as he does not have insurance at this time. AURY Strong looked at [facility] for the patient and they provider Advair at $60 per inhaler which patient states he can afford but he is only taking it PRN (does he need it then) and is also taking albuterol for rescue which would be $16 per unit. Please advice [facility] [phone]          Action Taken: Other: PULM    Travel Screening: Not Applicable

## 2023-08-29 ENCOUNTER — TRANSCRIBE ORDERS (OUTPATIENT)
Dept: OTHER | Age: 48
End: 2023-08-29

## 2023-08-29 ENCOUNTER — MEDICAL CORRESPONDENCE (OUTPATIENT)
Dept: HEALTH INFORMATION MANAGEMENT | Facility: CLINIC | Age: 48
End: 2023-08-29

## 2023-08-29 DIAGNOSIS — J45.909 ASTHMA: Primary | ICD-10-CM

## 2023-09-05 ENCOUNTER — TELEPHONE (OUTPATIENT)
Dept: PULMONOLOGY | Facility: CLINIC | Age: 48
End: 2023-09-05

## 2023-09-05 ENCOUNTER — APPOINTMENT (OUTPATIENT)
Dept: INTERPRETER SERVICES | Facility: CLINIC | Age: 48
End: 2023-09-05

## 2023-09-05 NOTE — TELEPHONE ENCOUNTER
Patient Contacted     Appointment type: RTA  Provider: SENG  Return date: 10/25/23  Specialty phone number: [phone]  Additional appointment(s) needed: NA  Additonal Notes: scheduled via .

## 2023-10-11 ENCOUNTER — TELEPHONE (OUTPATIENT)
Dept: PULMONOLOGY | Facility: CLINIC | Age: 48
End: 2023-10-11

## 2023-10-11 NOTE — TELEPHONE ENCOUNTER
Patient Contacted for the patient to call back and schedule the following:    Appointment type: Chest CT  Provider: SENG  Return date: 10/25/23  Specialty phone number: [phone]  Additional appointment(s) needed: N/A   Additonal Notes: N/A

## 2023-10-25 ENCOUNTER — OFFICE VISIT (OUTPATIENT)
Dept: PULMONOLOGY | Facility: CLINIC | Age: 48
End: 2023-10-25
Attending: STUDENT IN AN ORGANIZED HEALTH CARE EDUCATION/TRAINING PROGRAM

## 2023-10-25 ENCOUNTER — ANCILLARY PROCEDURE (OUTPATIENT)
Dept: CT IMAGING | Facility: CLINIC | Age: 48
End: 2023-10-25
Attending: STUDENT IN AN ORGANIZED HEALTH CARE EDUCATION/TRAINING PROGRAM

## 2023-10-25 VITALS — SYSTOLIC BLOOD PRESSURE: 147 MMHG | OXYGEN SATURATION: 97 % | HEART RATE: 60 BPM | DIASTOLIC BLOOD PRESSURE: 86 MMHG

## 2023-10-25 DIAGNOSIS — R91.1 LUNG NODULE: ICD-10-CM

## 2023-10-25 DIAGNOSIS — J45.30 MILD PERSISTENT ASTHMA WITHOUT COMPLICATION: ICD-10-CM

## 2023-10-25 DIAGNOSIS — R91.8 PULMONARY NODULES: Primary | ICD-10-CM

## 2023-10-25 PROCEDURE — 99214 OFFICE O/P EST MOD 30 MIN: CPT | Mod: GC | Performed by: STUDENT IN AN ORGANIZED HEALTH CARE EDUCATION/TRAINING PROGRAM

## 2023-10-25 PROCEDURE — 71250 CT THORAX DX C-: CPT | Mod: GC | Performed by: RADIOLOGY

## 2023-10-25 PROCEDURE — 99213 OFFICE O/P EST LOW 20 MIN: CPT | Performed by: STUDENT IN AN ORGANIZED HEALTH CARE EDUCATION/TRAINING PROGRAM

## 2023-10-25 ASSESSMENT — PAIN SCALES - GENERAL: PAINLEVEL: NO PAIN (0)

## 2023-10-25 NOTE — PATIENT INSTRUCTIONS
Vamos a regresar con cinthia hortensia en [place] sobre el control de street asthma y el nodulo, depiende en la interpretacion de Radiologia.

## 2023-10-25 NOTE — NURSING NOTE
Chief Complaint   Patient presents with    Follow Up     Return asthma      Vitals were taken and medications were reconciled.     Elaine Henderson RMA  4:14 PM

## 2023-10-25 NOTE — LETTER
10/25/2023         RE: Dewayne Jha  [address]  [place] [address]        Dear Colleague,    Thank you for referring your patient, Dewayne Jha, to the [facility] [place]. Please see a copy of my visit note below.    Pulmonary Clinic Note   10/25/2023      PCP: No Ref-Primary, Physician    Reason for visit: Asthma, lung nodule follow up.    Pulmonary HPI:   Dewayne Jha is a 47 year old male w/o PMHx who returns to clinic for follow up of his asthma management.    Patient's symptoms remain well controlled on Advair, noting he has not had any flareups throughout the winter.  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 No fever, chills, CP, SOB, cough, hemoptysis, night sweats, unintentional weight loss, palpitations, lightheadedness, syncope.      Review of systems: a complete 12-point ROS conducted, & found to be negative w/ exceptions as noted in the HPI.    Past medical history:  Asthma    Past Surgical history:  Reviewed, non contributory.    Social history:  Social History     Tobacco Use    Smoking status: Never    Smokeless tobacco: Never   Substance Use Topics    Alcohol use: Yes    Drug use: Never       Family history:  Reviewed, non contributory    Medications:  Current Outpatient Medications   Medication    albuterol (PROAIR HFA/PROVENTIL HFA/VENTOLIN HFA) 108 (90 Base) MCG/ACT inhaler    fluticasone-salmeterol (ADVAIR) 250-50 MCG/ACT inhaler    famotidine (PEPCID) 20 MG tablet     No current facility-administered medications for this visit.       Allergies:  No Known Allergies    Physical examination:  There were no vitals taken for this visit.    General: NAD, cooperative  Eyes: Anicteric sclera, PERRL, EOMI  CV: RRR, no m/r/g.   Lungs: Non labored breathing on room air. CTAB posteriorly.  Ext: No clubbing or cyanosis. No pedal edema.  Skin: No rashes. The skin is warm  and dry.  Neuro: Intact mentation w/ normal speech    Labs: reviewed in EPIC & personally interpreted.     No new labs since last encounter.    Imaging: reviewed in EPIC & personally interpreted. Below are the interpretations from the formal Radiology review.    Chest X-ray 12/09/2022  IMPRESSION: No acute airspace disease. Persistent nodular opacity in the left hilum compared to prior x-ray 8/17/2022, may consider CT chest to rule out solitary pulmonary nodule.  recommended.    Low dose Chest CT w/o contrast 10/25/23  Preliminary Impression: Normal chest CT study. Formal impression to follow.     Assessment/Plan    1.Mild persistent asthma without complication  Stable at this time; no active issues.  -Continue with daily Advair (ICS/LABA) + FEDERICO PRN for heavy lifting    2. Lung nodule  Seen on CXR taken 12/9/22 ; unclear of the nodule etiology. CT chest low dose taken this appointment does not appear to show it on the MIPS scan. Suspect this is a conglomerate of vessels, but will wait for Radiology's formal interpretation.    RTC in 6 months for asthma management.    Patient was seen & discussed w/ Dr. De Leon,  who is in agreement.    Chris Thacker MD   Pulmonary and Critical Care Fellow         Again, thank you for allowing me to participate in the care of your patient.        Sincerely,        Chris Thacker MD

## 2023-10-25 NOTE — PROGRESS NOTES
Pulmonary Clinic Note   10/25/2023      PCP: No Ref-Primary, Physician    Reason for visit: Asthma, lung nodule follow up.    Pulmonary HPI:   Dewayne Jha is a 47 year old male w/o PMHx who returns to clinic for follow up of his asthma management.    Patient's symptoms remain well controlled on Advair, noting he has not had any flareups throughout the winter.  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 No fever, chills, CP, SOB, cough, hemoptysis, night sweats, unintentional weight loss, palpitations, lightheadedness, syncope.      Review of systems: a complete 12-point ROS conducted, & found to be negative w/ exceptions as noted in the HPI.    Past medical history:  Asthma    Past Surgical history:  Reviewed, non contributory.    Social history:  Social History     Tobacco Use    Smoking status: Never    Smokeless tobacco: Never   Substance Use Topics    Alcohol use: Yes    Drug use: Never       Family history:  Reviewed, non contributory    Medications:  Current Outpatient Medications   Medication    albuterol (PROAIR HFA/PROVENTIL HFA/VENTOLIN HFA) 108 (90 Base) MCG/ACT inhaler    fluticasone-salmeterol (ADVAIR) 250-50 MCG/ACT inhaler    famotidine (PEPCID) 20 MG tablet     No current facility-administered medications for this visit.       Allergies:  No Known Allergies    Physical examination:  There were no vitals taken for this visit.    General: NAD, cooperative  Eyes: Anicteric sclera, PERRL, EOMI  CV: RRR, no m/r/g.   Lungs: Non labored breathing on room air. CTAB posteriorly.  Ext: No clubbing or cyanosis. No pedal edema.  Skin: No rashes. The skin is warm and dry.  Neuro: Intact mentation w/ normal speech    Labs: reviewed in [employer] & personally interpreted.     No new labs since last encounter.    Imaging: reviewed in [employer] & personally interpreted. Below are the interpretations from the formal Radiology review.    Chest X-ray 12/09/2022  IMPRESSION: No  acute airspace disease. Persistent nodular opacity in the left hilum compared to prior x-ray 8/17/2022, may consider CT chest to rule out solitary pulmonary nodule.  recommended.    Low dose Chest CT w/o contrast 10/25/23  Preliminary Impression: Normal chest CT study. Formal impression to follow.     Assessment/Plan    1.Mild persistent asthma without complication  Stable at this time; no active issues.  -Continue with daily Advair (ICS/LABA) + FEDERICO PRN for heavy lifting    2. Lung nodule  Seen on CXR taken 12/9/22 ; unclear of the nodule etiology. CT chest low dose taken this appointment does not appear to show it on the MIPS scan. Suspect this is a conglomerate of vessels, but will wait for Radiology's formal interpretation.    RTC in 6 months for asthma management.    Patient was seen & discussed w/ Dr. De Leon,  who is in agreement.    Chris Thacker MD   Pulmonary and Critical Care Fellow

## 2023-11-20 ENCOUNTER — TELEPHONE (OUTPATIENT)
Dept: PULMONOLOGY | Facility: CLINIC | Age: 48
End: 2023-11-20

## 2023-11-20 NOTE — TELEPHONE ENCOUNTER
Patient Contacted for the patient to call back and schedule the following:    Appointment type: RTA  Provider: SENG  Return date: 05/08/2024  Specialty phone number: [phone]  Additional appointment(s) needed: N/A  Additonal Notes: N/A

## 2024-05-08 ENCOUNTER — OFFICE VISIT (OUTPATIENT)
Dept: PULMONOLOGY | Facility: CLINIC | Age: 49
End: 2024-05-08
Attending: STUDENT IN AN ORGANIZED HEALTH CARE EDUCATION/TRAINING PROGRAM

## 2024-05-08 VITALS — OXYGEN SATURATION: 96 % | SYSTOLIC BLOOD PRESSURE: 155 MMHG | DIASTOLIC BLOOD PRESSURE: 82 MMHG | HEART RATE: 68 BPM

## 2024-05-08 DIAGNOSIS — J45.30 MILD PERSISTENT ASTHMA WITHOUT COMPLICATION: Primary | ICD-10-CM

## 2024-05-08 PROCEDURE — 99213 OFFICE O/P EST LOW 20 MIN: CPT | Mod: GC | Performed by: STUDENT IN AN ORGANIZED HEALTH CARE EDUCATION/TRAINING PROGRAM

## 2024-05-08 PROCEDURE — 99213 OFFICE O/P EST LOW 20 MIN: CPT | Performed by: STUDENT IN AN ORGANIZED HEALTH CARE EDUCATION/TRAINING PROGRAM

## 2024-05-08 ASSESSMENT — PAIN SCALES - GENERAL: PAINLEVEL: NO PAIN (0)

## 2024-05-08 NOTE — PROGRESS NOTES
Pulmonary Clinic Note   05/08/2024      PCP: No Ref-Primary, Physician    Reason for visit: Asthma, lung nodule follow up.    Pulmonary HPI:   Dewayne Jha is a 47 year old male w/o PMHx who returns to clinic for follow up of his asthma management.    Patient's symptoms remain well controlled on Advair, noting minimal symptoms at night with the change in temperature. He notes using his inhaler with resolution of symptoms.  He admits to mild knee pain with climbing up and down ladders at work. No fever, chills, CP, SOB, hemoptysis, night sweats, unintentional weight loss, palpitations, lightheadedness, syncope.    Review of systems: a complete 12-point ROS conducted, & found to be negative w/ exceptions as noted in the HPI.    Past medical history:  Asthma    Past Surgical history:  Reviewed, non contributory.    Social history:  Social History     Tobacco Use    Smoking status: Never    Smokeless tobacco: Never   Substance Use Topics    Alcohol use: Yes    Drug use: Never       Family history:  Reviewed, non contributory    Medications:  Current Outpatient Medications   Medication Sig Dispense Refill    albuterol (PROAIR HFA/PROVENTIL HFA/VENTOLIN HFA) 108 (90 Base) MCG/ACT inhaler Inhale 2 puffs into the lungs every 6 hours as needed for shortness of breath / dyspnea or wheezing 18 g 0    famotidine (PEPCID) 20 MG tablet Take 1 tablet (20 mg) by mouth 2 times daily (Patient not taking: Reported on 8/17/2022) 30 tablet 0    fluticasone-salmeterol (ADVAIR) 250-50 MCG/ACT inhaler Inhale 1 puff into the lungs 2 times daily 1 each 3     No current facility-administered medications for this visit.       Allergies:  No Known Allergies    Physical examination:  BP (!) 155/82   Pulse 68   SpO2 96%     General: NAD, cooperative  Eyes: Anicteric sclera, PERRL, EOMI  CV: RRR, no m/r/g.   Lungs: Non labored breathing on room air. CTAB posteriorly.  Ext: No clubbing or cyanosis. No pedal edema.  Skin: No rashes. The  skin is warm and dry.  Neuro: Intact mentation w/ normal speech    Labs: reviewed in EPIC & personally interpreted.     No new labs since last encounter.    Imaging: reviewed in EPIC & personally interpreted. Below are the interpretations from the formal Radiology review.    Low dose chest CT 10/25/23  IMPRESSION:  No suspicious pulmonary nodules.    Low dose Chest CT w/o contrast 10/25/23  Preliminary Impression: Normal chest CT study. Formal impression to follow.     Assessment/Plan    1.Mild persistent asthma without complication  Stable at this time; no active issues.  -Continue with daily Advair (ICS/LABA) + FEDERICO PRN for heavy lifting    2. Lung nodule  Seen on CXR taken 12/9/22; CT chest low dose taken 10/25/23 negative for pulmonary nodules.      Patient to be discharged from Pulmonary clinic; he will reach out through his PMD if he needs to be seen again by us.    Patient was seen & discussed w/ Dr. Edge,  who is in agreement.    Chris Thacker MD   Pulmonary and Critical Care Fellow

## 2024-05-08 NOTE — NURSING NOTE
Chief Complaint   Patient presents with    Follow Up     6 month asthma follow up      Vitals were taken and medications were reconciled.     Elaine Henderson RMA  4:00 PM

## 2024-05-08 NOTE — LETTER
5/8/2024         RE: Dewayne Jha  [address]  [place] [address]        Dear Colleague,    Thank you for referring your patient, Dewayne Jha, to the [facility] [place]. Please see a copy of my visit note below.    Pulmonary Clinic Note   05/08/2024      PCP: No Ref-Primary, Physician    Reason for visit: Asthma, lung nodule follow up.    Pulmonary HPI:   Dewayne Jha is a 47 year old male w/o PMHx who returns to clinic for follow up of his asthma management.    Patient's symptoms remain well controlled on Advair, noting minimal symptoms at night with the change in temperature. He notes using his inhaler with resolution of symptoms.  He admits to mild knee pain with climbing up and down ladders at work. No fever, chills, CP, SOB, hemoptysis, night sweats, unintentional weight loss, palpitations, lightheadedness, syncope.    Review of systems: a complete 12-point ROS conducted, & found to be negative w/ exceptions as noted in the HPI.    Past medical history:  Asthma    Past Surgical history:  Reviewed, non contributory.    Social history:  Social History     Tobacco Use     Smoking status: Never     Smokeless tobacco: Never   Substance Use Topics     Alcohol use: Yes     Drug use: Never       Family history:  Reviewed, non contributory    Medications:  Current Outpatient Medications   Medication Sig Dispense Refill     albuterol (PROAIR HFA/PROVENTIL HFA/VENTOLIN HFA) 108 (90 Base) MCG/ACT inhaler Inhale 2 puffs into the lungs every 6 hours as needed for shortness of breath / dyspnea or wheezing 18 g 0     famotidine (PEPCID) 20 MG tablet Take 1 tablet (20 mg) by mouth 2 times daily (Patient not taking: Reported on 8/17/2022) 30 tablet 0     fluticasone-salmeterol (ADVAIR) 250-50 MCG/ACT inhaler Inhale 1 puff into the lungs 2 times daily 1 each 3     No current facility-administered medications for this visit.        Allergies:  No Known Allergies    Physical examination:  BP (!) 155/82   Pulse 68   SpO2 96%     General: NAD, cooperative  Eyes: Anicteric sclera, PERRL, EOMI  CV: RRR, no m/r/g.   Lungs: Non labored breathing on room air. CTAB posteriorly.  Ext: No clubbing or cyanosis. No pedal edema.  Skin: No rashes. The skin is warm and dry.  Neuro: Intact mentation w/ normal speech    Labs: reviewed in [facility] & personally interpreted.     No new labs since last encounter.    Imaging: reviewed in [facility] & personally interpreted. Below are the interpretations from the formal Radiology review.    Low dose chest CT 10/25/23  IMPRESSION:  No suspicious pulmonary nodules.    Low dose Chest CT w/o contrast 10/25/23  Preliminary Impression: Normal chest CT study. Formal impression to follow.     Assessment/Plan    1.Mild persistent asthma without complication  Stable at this time; no active issues.  -Continue with daily Advair (ICS/LABA) + FEDERICO PRN for heavy lifting    2. Lung nodule  Seen on CXR taken 12/9/22; CT chest low dose taken 10/25/23 negative for pulmonary nodules.      Patient to be discharged from Pulmonary clinic; he will reach out through his PMD if he needs to be seen again by us.    Patient was seen & discussed w/ Dr. Edge,  who is in agreement.    Chris Thacker MD   Pulmonary and Critical Care Fellow         Attestation signed by Ramesh Edge MD at 5/8/2024  6:35 PM:    Faculty Addendum:  This patient has been seen and evaluated by me.  I have discussed care with Dr. Thacker and agree with the findings and plan in this note.    Ramesh Edge MD  Pulmonary/Critical Care  May 8, 2024 6:34 PM        Again, thank you for allowing me to participate in the care of your patient.        Sincerely,        Chris Thacker MD

## 2024-07-24 ENCOUNTER — LAB REQUISITION (OUTPATIENT)
Dept: LAB | Facility: HOSPITAL | Age: 49
End: 2024-07-24

## 2024-07-24 LAB
ANION GAP SERPL CALCULATED.3IONS-SCNC: 9 MMOL/L (ref 7–15)
BUN SERPL-MCNC: 11.8 MG/DL (ref 6–20)
CALCIUM SERPL-MCNC: 9 MG/DL (ref 8.8–10.4)
CHLORIDE SERPL-SCNC: 103 MMOL/L (ref 98–107)
CHOLEST SERPL-MCNC: 200 MG/DL
CREAT SERPL-MCNC: 0.76 MG/DL (ref 0.67–1.17)
EGFRCR SERPLBLD CKD-EPI 2021: >90 ML/MIN/1.73M2
FASTING STATUS PATIENT QL REPORTED: YES
FASTING STATUS PATIENT QL REPORTED: YES
GLUCOSE SERPL-MCNC: 107 MG/DL (ref 70–99)
HBA1C MFR BLD: 6.2 %
HCO3 SERPL-SCNC: 26 MMOL/L (ref 22–29)
HDLC SERPL-MCNC: 35 MG/DL
LDLC SERPL CALC-MCNC: 120 MG/DL
NONHDLC SERPL-MCNC: 165 MG/DL
POTASSIUM SERPL-SCNC: 3.6 MMOL/L (ref 3.4–5.3)
SODIUM SERPL-SCNC: 138 MMOL/L (ref 135–145)
TRIGL SERPL-MCNC: 224 MG/DL

## 2024-07-24 PROCEDURE — 36415 COLL VENOUS BLD VENIPUNCTURE: CPT | Performed by: FAMILY MEDICINE

## 2024-07-24 PROCEDURE — 83036 HEMOGLOBIN GLYCOSYLATED A1C: CPT | Performed by: FAMILY MEDICINE

## 2024-07-24 PROCEDURE — 80048 BASIC METABOLIC PNL TOTAL CA: CPT | Performed by: FAMILY MEDICINE

## 2024-07-24 PROCEDURE — 80061 LIPID PANEL: CPT | Performed by: FAMILY MEDICINE

## 2024-09-23 ENCOUNTER — LAB REQUISITION (OUTPATIENT)
Dept: LAB | Facility: HOSPITAL | Age: 49
End: 2024-09-23

## 2024-09-23 LAB
CHOLEST SERPL-MCNC: 208 MG/DL
FASTING STATUS PATIENT QL REPORTED: YES
HDLC SERPL-MCNC: 34 MG/DL
LDLC SERPL CALC-MCNC: 142 MG/DL
NONHDLC SERPL-MCNC: 174 MG/DL
TRIGL SERPL-MCNC: 158 MG/DL

## 2024-09-23 PROCEDURE — 80061 LIPID PANEL: CPT | Performed by: FAMILY MEDICINE

## 2024-09-23 PROCEDURE — 36415 COLL VENOUS BLD VENIPUNCTURE: CPT | Performed by: FAMILY MEDICINE

## 2025-04-16 ENCOUNTER — LAB REQUISITION (OUTPATIENT)
Dept: LAB | Facility: CLINIC | Age: 50
End: 2025-04-16

## 2025-04-16 LAB
EST. AVERAGE GLUCOSE BLD GHB EST-MCNC: 131 MG/DL
HBA1C MFR BLD: 6.2 %

## 2025-04-16 PROCEDURE — 83036 HEMOGLOBIN GLYCOSYLATED A1C: CPT | Performed by: FAMILY MEDICINE

## 2025-08-27 ENCOUNTER — LAB REQUISITION (OUTPATIENT)
Dept: LAB | Facility: HOSPITAL | Age: 50
End: 2025-08-27

## 2025-08-27 LAB
ALBUMIN SERPL BCG-MCNC: 4.4 G/DL (ref 3.5–5.2)
ALP SERPL-CCNC: 99 U/L (ref 40–150)
ALT SERPL W P-5'-P-CCNC: 49 U/L (ref 0–70)
ANION GAP SERPL CALCULATED.3IONS-SCNC: 12 MMOL/L (ref 7–15)
AST SERPL W P-5'-P-CCNC: 32 U/L (ref 0–45)
BILIRUB SERPL-MCNC: 0.3 MG/DL
BUN SERPL-MCNC: 15.2 MG/DL (ref 6–20)
CALCIUM SERPL-MCNC: 9.3 MG/DL (ref 8.8–10.4)
CHLORIDE SERPL-SCNC: 101 MMOL/L (ref 98–107)
CHOLEST SERPL-MCNC: 225 MG/DL
CREAT SERPL-MCNC: 0.74 MG/DL (ref 0.67–1.17)
CREAT UR-MCNC: 127 MG/DL
EGFRCR SERPLBLD CKD-EPI 2021: >90 ML/MIN/1.73M2
EST. AVERAGE GLUCOSE BLD GHB EST-MCNC: 134 MG/DL
FASTING STATUS PATIENT QL REPORTED: YES
FASTING STATUS PATIENT QL REPORTED: YES
GLUCOSE SERPL-MCNC: 118 MG/DL (ref 70–99)
HBA1C MFR BLD: 6.3 %
HCO3 SERPL-SCNC: 27 MMOL/L (ref 22–29)
HDLC SERPL-MCNC: 37 MG/DL
LDLC SERPL CALC-MCNC: 136 MG/DL
MICROALBUMIN UR-MCNC: 24.5 MG/L
MICROALBUMIN/CREAT UR: 19.29 MG/G CR (ref 0–17)
NONHDLC SERPL-MCNC: 188 MG/DL
POTASSIUM SERPL-SCNC: 4.1 MMOL/L (ref 3.4–5.3)
PROT SERPL-MCNC: 7.5 G/DL (ref 6.4–8.3)
SODIUM SERPL-SCNC: 140 MMOL/L (ref 135–145)
TRIGL SERPL-MCNC: 258 MG/DL

## 2025-08-27 PROCEDURE — 36415 COLL VENOUS BLD VENIPUNCTURE: CPT | Performed by: FAMILY MEDICINE

## 2025-08-27 PROCEDURE — 83036 HEMOGLOBIN GLYCOSYLATED A1C: CPT | Performed by: FAMILY MEDICINE

## 2025-08-27 PROCEDURE — 82570 ASSAY OF URINE CREATININE: CPT | Performed by: FAMILY MEDICINE

## 2025-08-27 PROCEDURE — 80061 LIPID PANEL: CPT | Performed by: FAMILY MEDICINE

## 2025-08-27 PROCEDURE — 80053 COMPREHEN METABOLIC PANEL: CPT | Performed by: FAMILY MEDICINE

## (undated) RX ORDER — ALBUTEROL SULFATE 0.83 MG/ML
SOLUTION RESPIRATORY (INHALATION)
Status: DISPENSED
Start: 2022-08-17